# Patient Record
Sex: FEMALE | Race: OTHER | HISPANIC OR LATINO
[De-identification: names, ages, dates, MRNs, and addresses within clinical notes are randomized per-mention and may not be internally consistent; named-entity substitution may affect disease eponyms.]

---

## 2023-02-15 ENCOUNTER — APPOINTMENT (OUTPATIENT)
Dept: CARDIOLOGY | Facility: CLINIC | Age: 43
End: 2023-02-15
Payer: COMMERCIAL

## 2023-02-15 VITALS — SYSTOLIC BLOOD PRESSURE: 154 MMHG | DIASTOLIC BLOOD PRESSURE: 94 MMHG

## 2023-02-15 VITALS
OXYGEN SATURATION: 99 % | WEIGHT: 197 LBS | HEART RATE: 75 BPM | HEIGHT: 66 IN | BODY MASS INDEX: 31.66 KG/M2 | DIASTOLIC BLOOD PRESSURE: 88 MMHG | SYSTOLIC BLOOD PRESSURE: 148 MMHG

## 2023-02-15 DIAGNOSIS — Z82.49 FAMILY HISTORY OF ISCHEMIC HEART DISEASE AND OTHER DISEASES OF THE CIRCULATORY SYSTEM: ICD-10-CM

## 2023-02-15 DIAGNOSIS — I10 ESSENTIAL (PRIMARY) HYPERTENSION: ICD-10-CM

## 2023-02-15 DIAGNOSIS — I44.1 ATRIOVENTRICULAR BLOCK, SECOND DEGREE: ICD-10-CM

## 2023-02-15 DIAGNOSIS — E78.00 PURE HYPERCHOLESTEROLEMIA, UNSPECIFIED: ICD-10-CM

## 2023-02-15 PROCEDURE — 99204 OFFICE O/P NEW MOD 45 MIN: CPT

## 2023-02-16 PROBLEM — E78.00 HIGH CHOLESTEROL: Status: ACTIVE | Noted: 2023-02-16

## 2023-02-16 PROBLEM — I44.1 SECOND DEGREE AV BLOCK: Status: ACTIVE | Noted: 2023-02-16

## 2023-02-16 PROBLEM — I10 ESSENTIAL HYPERTENSION: Status: ACTIVE | Noted: 2023-02-16

## 2023-02-16 NOTE — ASSESSMENT
[FreeTextEntry1] : Palpitations - improved.\par Neg stress and echo recently.\par Records provided for my review include only 1 day of cardiac monitoring which was normal. The episode of heart block of concern is not available for my review.

## 2023-02-16 NOTE — CARDIOLOGY SUMMARY
[de-identified] : Provided from outside office.  NSR Poor R wave progression non-spec T wave abn. Declines repeat.

## 2023-02-16 NOTE — DISCUSSION/SUMMARY
[FreeTextEntry1] : Advised contin efforts for diet, wt loss, exercise, low sodium diet.\par Advised follow up 1 mo, contin home BP monitoring, and advised if BP not improved would likely prescribe antihypertensive med then.\par Requested complete records to be provided for my review, and that at this time I did not think EP eval was necessary.\par Consider sleep study to eval for TOÑO.\par

## 2023-02-16 NOTE — HISTORY OF PRESENT ILLNESS
[FreeTextEntry1] : PALMA DUFFY is a 42 year old female presents self-referred for second opinion.\par \par Had flu over [place] with persistent symptoms, took otc cold meds. Developed palpitations, saw cardiologist, had work up that was unremarkable except an episode of 2:1 heart block on holter monitor, had neg blood work for autoimmune or inflammatory condition.  Was referred to EP.  \par Presents for second opinion.  Was also found to have elev BP and high cholesterol, and was advised she had one month to change lifestyle prior to going on meds.\par \par She still complains of nasal congestion. No fever or cough.  She had been exercising prior to the Flu and just restarted a few days ago.  Has lost 14 lbs in 1 month with diet.\par No chest pain.  + possible shortness of breath she thinks is related to her nasal congestion.  Able to complete her workouts.  No lightheadedness or syncope.\par \par Fam hist - Father - HTN.  Maternal grandmother had heart disease.\par \par + mild snoring.  No observed sleep apnea.  No headaches.  + feels tired during the day but not falling asleep. She does occasionally fall asleep if laying down.\par \par Home BP monitor - has been labile, 155/108, 161/98, 138/87, 155/104, 147/98.\par \par Lipid profile 1/13 by  PCP - , HDL 81, , TG 44.

## 2023-05-11 ENCOUNTER — APPOINTMENT (OUTPATIENT)
Dept: ELECTROPHYSIOLOGY | Facility: CLINIC | Age: 43
End: 2023-05-11